# Patient Record
Sex: FEMALE | URBAN - METROPOLITAN AREA
[De-identification: names, ages, dates, MRNs, and addresses within clinical notes are randomized per-mention and may not be internally consistent; named-entity substitution may affect disease eponyms.]

---

## 2019-10-28 ENCOUNTER — APPOINTMENT (RX ONLY)
Dept: URBAN - METROPOLITAN AREA CLINIC 86 | Facility: CLINIC | Age: 69
Setting detail: DERMATOLOGY
End: 2019-10-28

## 2019-10-28 PROBLEM — L08.0 PYODERMA: Status: ACTIVE | Noted: 2019-10-28

## 2019-10-28 PROCEDURE — ? DIAGNOSIS COMMENT

## 2019-10-28 PROCEDURE — ? TREATMENT REGIMEN

## 2019-10-28 PROCEDURE — ? OBSERVATION

## 2019-10-28 PROCEDURE — ? PRESCRIPTION

## 2019-10-28 PROCEDURE — 99202 OFFICE O/P NEW SF 15 MIN: CPT

## 2019-10-28 PROCEDURE — ? ORDER TESTS

## 2019-10-28 RX ORDER — DOXYCYCLINE HYCLATE 100 MG/1
TABLET, COATED ORAL
Qty: 28 | Refills: 0 | Status: ERX | COMMUNITY
Start: 2019-10-28

## 2019-10-28 RX ORDER — SALICYLIC ACID 30 MG/G
OINTMENT TOPICAL
Qty: 1 | Refills: 1 | Status: ERX | COMMUNITY
Start: 2019-10-28

## 2019-10-28 RX ADMIN — SALICYLIC ACID: 30 OINTMENT TOPICAL at 14:51

## 2019-10-28 RX ADMIN — DOXYCYCLINE HYCLATE: 100 TABLET, COATED ORAL at 14:50

## 2019-10-28 NOTE — PROCEDURE: MIPS QUALITY
Quality 131: Pain Assessment And Follow-Up: Pain assessment using a standardized tool is documented as negative, no follow-up plan required
Quality 130: Documentation Of Current Medications In The Medical Record: Current Medications Documented
Quality 226: Preventive Care And Screening: Tobacco Use: Screening And Cessation Intervention: Patient screened for tobacco use and is an ex/non-smoker
Detail Level: Detailed
Quality 110: Preventive Care And Screening: Influenza Immunization: Influenza immunization was not ordered or administered, reason not given

## 2019-10-28 NOTE — PROCEDURE: DIAGNOSIS COMMENT
Comment: Patient is to clean the areas with Hydrogen Peroxide and gauze, soap and water, and apply Vaseline or Aquaphor with gauze bandages daily. Patient had blood panel done in July with PCP and will contact them to have the results sent to us for review. Patient had her second right toe amputated last month as part of the inadequately controlled issue.
Detail Level: Simple
Comment: The patient states she is prediabetic\\nThe eruption is symmetric on only the anterior lower legs with one toe involved. \\nThe lack of knowledge of the daughter and the patient about her past medical treatment in view of an amputated toe is concerning.\\nShe appears to have a chronic infection, with the embedded tissue perhaps indicating suboptimal wound care as a major issue.

## 2019-10-28 NOTE — PROCEDURE: ORDER TESTS
Expected Date Of Service: 10/28/2019
Performing Laboratory: [id number]
Billing Type: Third-Party Bill
Bill For Surgical Tray: no

## 2019-10-28 NOTE — PROCEDURE: TREATMENT REGIMEN
Samples Given: Bensal HP
Detail Level: Zone
Discontinue Regimen: Discontinue Kleenex tissue as a wound care item
Initiate Treatment: the patient and her mom were shown how to clean the wounds with H2O2, and apply the Bensal HP, and cover with guaze.\\nDoxycycline 100 mg bid
Plan: Culture the toe for bacteria.\\nSee weekly until she heals.\\nGet medical records.

## 2019-10-30 ENCOUNTER — RX ONLY (OUTPATIENT)
Age: 69
Setting detail: RX ONLY
End: 2019-10-30

## 2019-10-30 RX ORDER — MUPIROCIN 20 MG/G
OINTMENT TOPICAL
Qty: 1 | Refills: 1 | Status: ERX | COMMUNITY
Start: 2019-10-30

## 2019-11-15 ENCOUNTER — APPOINTMENT (RX ONLY)
Dept: URBAN - METROPOLITAN AREA CLINIC 86 | Facility: CLINIC | Age: 69
Setting detail: DERMATOLOGY
End: 2019-11-15

## 2019-11-15 PROBLEM — L08.0 PYODERMA: Status: ACTIVE | Noted: 2019-11-15

## 2019-11-15 PROCEDURE — ? DIAGNOSIS COMMENT

## 2019-11-15 PROCEDURE — ? PRESCRIPTION

## 2019-11-15 PROCEDURE — ? OBSERVATION

## 2019-11-15 PROCEDURE — 99213 OFFICE O/P EST LOW 20 MIN: CPT

## 2019-11-15 NOTE — PROCEDURE: DIAGNOSIS COMMENT
Detail Level: Simple
Comment: She is markedly improved, she is still stuff white tissue in her wounds, which is adhering and slowing down the healing process. Exact wound care was gone over with the patient and her daughter.

## 2019-11-15 NOTE — PROCEDURE: MIPS QUALITY
Quality 226: Preventive Care And Screening: Tobacco Use: Screening And Cessation Intervention: Patient screened for tobacco use and is an ex/non-smoker
Quality 111:Pneumonia Vaccination Status For Older Adults: Pneumococcal Vaccination Previously Received
Quality 130: Documentation Of Current Medications In The Medical Record: Current Medications Documented
Quality 131: Pain Assessment And Follow-Up: Pain assessment using a standardized tool is documented as negative, no follow-up plan required
Quality 110: Preventive Care And Screening: Influenza Immunization: Influenza Immunization Administered during Influenza season
Quality 474: Zoster Vaccination Status: Shingrix Vaccination Administered or Previously Received
Detail Level: Detailed

## 2019-11-21 RX ORDER — SALICYLIC ACID 30 MG/G
OINTMENT TOPICAL
Qty: 1 | Refills: 1 | Status: ERX

## 2019-11-21 RX ORDER — DOXYCYCLINE HYCLATE 100 MG/1
TABLET, COATED ORAL
Qty: 28 | Refills: 0 | Status: ERX

## 2019-11-22 RX ORDER — DOXYCYCLINE HYCLATE 100 MG/1
TABLET, COATED ORAL
Qty: 28 | Refills: 0 | Status: ERX